# Patient Record
Sex: MALE | Race: ASIAN | NOT HISPANIC OR LATINO | Employment: FULL TIME | URBAN - METROPOLITAN AREA
[De-identification: names, ages, dates, MRNs, and addresses within clinical notes are randomized per-mention and may not be internally consistent; named-entity substitution may affect disease eponyms.]

---

## 2017-09-22 ENCOUNTER — OFFICE VISIT - HEALTHEAST (OUTPATIENT)
Dept: FAMILY MEDICINE | Facility: CLINIC | Age: 57
End: 2017-09-22

## 2017-09-22 DIAGNOSIS — J20.9 ACUTE BRONCHITIS: ICD-10-CM

## 2019-09-30 ENCOUNTER — SURGERY - HEALTHEAST (OUTPATIENT)
Dept: SURGERY | Facility: CLINIC | Age: 59
End: 2019-09-30

## 2019-09-30 ENCOUNTER — ANESTHESIA - HEALTHEAST (OUTPATIENT)
Dept: SURGERY | Facility: CLINIC | Age: 59
End: 2019-09-30

## 2019-09-30 ASSESSMENT — MIFFLIN-ST. JEOR
SCORE: 1175.06
SCORE: 1299.8

## 2019-10-02 ENCOUNTER — HOME CARE/HOSPICE - HEALTHEAST (OUTPATIENT)
Dept: HOME HEALTH SERVICES | Facility: HOME HEALTH | Age: 59
End: 2019-10-02

## 2019-10-04 ENCOUNTER — HOME CARE/HOSPICE - HEALTHEAST (OUTPATIENT)
Dept: HOME HEALTH SERVICES | Facility: HOME HEALTH | Age: 59
End: 2019-10-04

## 2019-10-08 ENCOUNTER — HOME CARE/HOSPICE - HEALTHEAST (OUTPATIENT)
Dept: HOME HEALTH SERVICES | Facility: HOME HEALTH | Age: 59
End: 2019-10-08

## 2020-04-29 ENCOUNTER — RECORDS - HEALTHEAST (OUTPATIENT)
Dept: LAB | Facility: CLINIC | Age: 60
End: 2020-04-29

## 2020-04-29 LAB
ALBUMIN SERPL-MCNC: 3.7 G/DL (ref 3.5–5)
ALP SERPL-CCNC: 143 U/L (ref 45–120)
ALT SERPL W P-5'-P-CCNC: 155 U/L (ref 0–45)
ANION GAP SERPL CALCULATED.3IONS-SCNC: 13 MMOL/L (ref 5–18)
AST SERPL W P-5'-P-CCNC: 61 U/L (ref 0–40)
BILIRUB SERPL-MCNC: 0.5 MG/DL (ref 0–1)
BUN SERPL-MCNC: 16 MG/DL (ref 8–22)
CALCIUM SERPL-MCNC: 9 MG/DL (ref 8.5–10.5)
CHLORIDE BLD-SCNC: 108 MMOL/L (ref 98–107)
CHOLEST SERPL-MCNC: 212 MG/DL
CO2 SERPL-SCNC: 17 MMOL/L (ref 22–31)
CREAT SERPL-MCNC: 0.84 MG/DL (ref 0.7–1.3)
FASTING STATUS PATIENT QL REPORTED: ABNORMAL
GFR SERPL CREATININE-BSD FRML MDRD: >60 ML/MIN/1.73M2
GLUCOSE BLD-MCNC: 108 MG/DL (ref 70–125)
HDLC SERPL-MCNC: 47 MG/DL
LDLC SERPL CALC-MCNC: 122 MG/DL
LIPASE SERPL-CCNC: 41 U/L (ref 0–52)
POTASSIUM BLD-SCNC: 3.9 MMOL/L (ref 3.5–5)
PROT SERPL-MCNC: 7.9 G/DL (ref 6–8)
SODIUM SERPL-SCNC: 138 MMOL/L (ref 136–145)
TRIGL SERPL-MCNC: 213 MG/DL

## 2020-04-30 ENCOUNTER — RECORDS - HEALTHEAST (OUTPATIENT)
Dept: LAB | Facility: CLINIC | Age: 60
End: 2020-04-30

## 2020-05-01 LAB
HAV IGM SERPL QL IA: NEGATIVE
HBV CORE IGM SERPL QL IA: NEGATIVE
HBV SURFACE AG SERPL QL IA: NEGATIVE
HCV AB SERPL QL IA: NEGATIVE

## 2020-05-04 LAB — H PYLORI AG STL QL IA: POSITIVE

## 2021-05-26 VITALS
RESPIRATION RATE: 12 BRPM | OXYGEN SATURATION: 98 % | HEART RATE: 70 BPM | DIASTOLIC BLOOD PRESSURE: 60 MMHG | SYSTOLIC BLOOD PRESSURE: 110 MMHG | TEMPERATURE: 98 F

## 2021-05-26 VITALS
DIASTOLIC BLOOD PRESSURE: 70 MMHG | TEMPERATURE: 98.7 F | SYSTOLIC BLOOD PRESSURE: 127 MMHG | OXYGEN SATURATION: 98 % | HEART RATE: 80 BPM

## 2021-05-31 VITALS — WEIGHT: 128.1 LBS

## 2021-06-01 NOTE — ANESTHESIA CARE TRANSFER NOTE
Patient transferred to [facility].  Taken to PACU on room air.  In PACU monitors on, vital signs stable.  SBAR report given to RN on floor per institutional policy and procedure.  Transport called.  Transfer of care.    Last vitals:   Vitals:    09/30/19 2035   BP: 145/77   Pulse: 60   Resp: 14   Temp: 36.4  C (97.5  F)   SpO2: 100%     Patient's level of consciousness is drowsy  Spontaneous respirations: yes  Maintains airway independently: yes  Dentition unchanged: yes  Oropharynx: oropharynx clear of all foreign objects    QCDR Measures:  ASA# 20 - Surgical Safety Checklist: WHO surgical safety checklist completed prior to induction    PQRS# 430 - Adult PONV Prevention: 4558F - Pt received => 2 anti-emetic agents (different classes) preop & intraop  ASA# 8 - Peds PONV Prevention: NA - Not pediatric patient, not GA or 2 or more risk factors NOT present  PQRS# 424 - Roma-op Temp Management: 4559F - At least one body temp DOCUMENTED => 35.5C or 95.9F within required timeframe  PQRS# 426 - PACU Transfer Protocol: - Transfer of care checklist used  ASA# 14 - Acute Post-op Pain: ASA14B - Patient did NOT experience pain >= 7 out of 10

## 2021-06-01 NOTE — ANESTHESIA PREPROCEDURE EVALUATION
Anesthesia Evaluation      Patient summary reviewed     Airway   Mallampati: II   Pulmonary - normal exam                          Cardiovascular   Rate: normal,         Neuro/Psych      Endo/Other       GI/Hepatic/Renal            Dental - normal exam                        Anesthesia Plan  Planned anesthetic: peripheral nerve block    ASA 2     Anesthetic plan and risks discussed with: patient  Anesthesia plan special considerations: antiemetics,   Post-op plan: routine recovery

## 2021-06-01 NOTE — ANESTHESIA PROCEDURE NOTES
Peripheral Block    Patient location during procedure: pre-op  Start time: 9/30/2019 7:35 PM  End time: 9/30/2019 7:40 PM  post-op analgesia per surgeon order as noted in medical record  Staffing:  Performing  Anesthesiologist: Vic Quigley MD  Preanesthetic Checklist  Completed: patient identified, site marked, risks, benefits, and alternatives discussed, timeout performed, consent obtained, at patient's request, airway assessed, oxygen available, suction available, emergency drugs available and hand hygiene performed  Peripheral Block  Block type: brachial plexus  Prep: ChloraPrep  Patient position: supine  Patient monitoring: cardiac monitor, continuous pulse oximetry, heart rate and blood pressure  Laterality: left  Injection technique: ultrasound guided            Needle  Needle type: Stimuplex   Needle gauge: 21 G  Needle length: 4 in  no peripheral nerve catheter placed  Assessment  Injection assessment: no difficulty with injection, negative aspiration for heme, no paresthesia on injection and incremental injection

## 2021-06-01 NOTE — ANESTHESIA POSTPROCEDURE EVALUATION
Patient: Roma Velásquez  IRRIGATION AND DEBRIDEMENT, LEFT MIDDLE FINGER  Anesthesia type: No value filed.    Patient location: PACU  Last vitals:   Vitals Value Taken Time   /77 9/30/2019  8:35 PM   Temp 36.4  C (97.5  F) 9/30/2019  8:35 PM   Pulse 60 9/30/2019  8:35 PM   Resp 14 9/30/2019  8:35 PM   SpO2 100 % 9/30/2019  8:35 PM     Post vital signs: stable  Level of consciousness: awake and responds to simple questions  Post-anesthesia pain: pain controlled  Post-anesthesia nausea and vomiting: no  Pulmonary: unassisted, return to baseline  Cardiovascular: stable and blood pressure at baseline  Hydration: adequate  Anesthetic events: no    QCDR Measures:  ASA# 11 - Roma-op Cardiac Arrest: ASA11B - Patient did NOT experience unanticipated cardiac arrest  ASA# 12 - Roma-op Mortality Rate: ASA12B - Patient did NOT die  ASA# 13 - PACU Re-Intubation Rate: NA - No ETT / LMA used for case  ASA# 10 - Composite Anes Safety: ASA10A - No serious adverse event    Additional Notes:

## 2021-06-03 VITALS — BODY MASS INDEX: 24.56 KG/M2 | HEIGHT: 62 IN | WEIGHT: 133.5 LBS

## 2021-06-16 PROBLEM — R25.2 TRISMUS: Status: ACTIVE | Noted: 2020-04-19

## 2021-07-03 NOTE — ADDENDUM NOTE
Addendum Note by Vic Quigley MD at 10/1/2019  8:49 AM     Author: Vic Quigley MD Service: -- Author Type: Anesthesiologist    Filed: 10/1/2019  8:49 AM Date of Service: 10/1/2019  8:49 AM Status: Signed    : Vic Quigley MD (Anesthesiologist)       Addendum  created 10/01/19 0849 by Vic Quigley MD    Clinical Note Signed, Intraprocedure Blocks edited, Intraprocedure Meds edited

## 2021-08-06 ENCOUNTER — LAB REQUISITION (OUTPATIENT)
Dept: LAB | Facility: CLINIC | Age: 61
End: 2021-08-06

## 2021-08-06 ENCOUNTER — LAB REQUISITION (OUTPATIENT)
Dept: LAB | Facility: CLINIC | Age: 61
End: 2021-08-06
Payer: COMMERCIAL

## 2021-08-06 DIAGNOSIS — R50.9 FEVER, UNSPECIFIED: ICD-10-CM

## 2021-08-06 PROCEDURE — U0005 INFEC AGEN DETEC AMPLI PROBE: HCPCS | Mod: ORL | Performed by: PHYSICIAN ASSISTANT

## 2021-08-06 PROCEDURE — 87070 CULTURE OTHR SPECIMN AEROBIC: CPT | Performed by: PHYSICIAN ASSISTANT

## 2021-08-07 LAB — SARS-COV-2 RNA RESP QL NAA+PROBE: POSITIVE

## 2021-08-08 LAB — BACTERIA SPEC CULT: NORMAL

## 2022-10-21 ENCOUNTER — LAB REQUISITION (OUTPATIENT)
Dept: LAB | Facility: CLINIC | Age: 62
End: 2022-10-21

## 2022-10-21 DIAGNOSIS — K30 FUNCTIONAL DYSPEPSIA: ICD-10-CM

## 2022-10-21 LAB
ALBUMIN SERPL BCG-MCNC: 4.5 G/DL (ref 3.5–5.2)
ALP SERPL-CCNC: 105 U/L (ref 40–129)
ALT SERPL W P-5'-P-CCNC: 34 U/L (ref 10–50)
ANION GAP SERPL CALCULATED.3IONS-SCNC: 9 MMOL/L (ref 7–15)
AST SERPL W P-5'-P-CCNC: 31 U/L (ref 10–50)
BILIRUB SERPL-MCNC: 0.9 MG/DL
BUN SERPL-MCNC: 11.6 MG/DL (ref 8–23)
CALCIUM SERPL-MCNC: 9.2 MG/DL (ref 8.8–10.2)
CHLORIDE SERPL-SCNC: 100 MMOL/L (ref 98–107)
CREAT SERPL-MCNC: 0.87 MG/DL (ref 0.67–1.17)
DEPRECATED HCO3 PLAS-SCNC: 26 MMOL/L (ref 22–29)
ERYTHROCYTE [DISTWIDTH] IN BLOOD BY AUTOMATED COUNT: 11.4 % (ref 10–15)
GFR SERPL CREATININE-BSD FRML MDRD: >90 ML/MIN/1.73M2
GLUCOSE SERPL-MCNC: 73 MG/DL (ref 70–99)
HCT VFR BLD AUTO: 50.5 % (ref 40–53)
HGB BLD-MCNC: 16.5 G/DL (ref 13.3–17.7)
LIPASE SERPL-CCNC: 21 U/L (ref 13–60)
MCH RBC QN AUTO: 30.1 PG (ref 26.5–33)
MCHC RBC AUTO-ENTMCNC: 32.7 G/DL (ref 31.5–36.5)
MCV RBC AUTO: 92 FL (ref 78–100)
PLATELET # BLD AUTO: 268 10E3/UL (ref 150–450)
POTASSIUM SERPL-SCNC: 4.4 MMOL/L (ref 3.4–5.3)
PROT SERPL-MCNC: 7.4 G/DL (ref 6.4–8.3)
RBC # BLD AUTO: 5.49 10E6/UL (ref 4.4–5.9)
SODIUM SERPL-SCNC: 135 MMOL/L (ref 136–145)
WBC # BLD AUTO: 5.9 10E3/UL (ref 4–11)

## 2022-10-21 PROCEDURE — 80053 COMPREHEN METABOLIC PANEL: CPT | Performed by: FAMILY MEDICINE

## 2022-10-21 PROCEDURE — 83690 ASSAY OF LIPASE: CPT | Performed by: FAMILY MEDICINE

## 2022-10-21 PROCEDURE — 85027 COMPLETE CBC AUTOMATED: CPT | Performed by: FAMILY MEDICINE

## 2023-11-18 ENCOUNTER — HOSPITAL ENCOUNTER (EMERGENCY)
Facility: HOSPITAL | Age: 63
Discharge: HOME | End: 2023-11-18
Attending: EMERGENCY MEDICINE | Admitting: EMERGENCY MEDICINE
Payer: COMMERCIAL

## 2023-11-18 ENCOUNTER — APPOINTMENT (OUTPATIENT)
Dept: ULTRASOUND IMAGING | Facility: HOSPITAL | Age: 63
End: 2023-11-18
Attending: EMERGENCY MEDICINE
Payer: COMMERCIAL

## 2023-11-18 ENCOUNTER — APPOINTMENT (OUTPATIENT)
Dept: RADIOLOGY | Facility: HOSPITAL | Age: 63
End: 2023-11-18
Attending: EMERGENCY MEDICINE
Payer: COMMERCIAL

## 2023-11-18 VITALS
DIASTOLIC BLOOD PRESSURE: 77 MMHG | SYSTOLIC BLOOD PRESSURE: 109 MMHG | HEART RATE: 56 BPM | TEMPERATURE: 98 F | WEIGHT: 144 LBS | HEIGHT: 62 IN | OXYGEN SATURATION: 92 % | BODY MASS INDEX: 26.5 KG/M2 | RESPIRATION RATE: 16 BRPM

## 2023-11-18 DIAGNOSIS — R06.00 DYSPNEA, UNSPECIFIED TYPE: ICD-10-CM

## 2023-11-18 DIAGNOSIS — J06.9 VIRAL UPPER RESPIRATORY INFECTION: ICD-10-CM

## 2023-11-18 DIAGNOSIS — R10.11 ABDOMINAL PAIN, RIGHT UPPER QUADRANT: ICD-10-CM

## 2023-11-18 LAB
ALBUMIN SERPL BCG-MCNC: 4.1 G/DL (ref 3.5–5.2)
ALBUMIN UR-MCNC: NEGATIVE MG/DL
ALP SERPL-CCNC: 100 U/L (ref 40–150)
ALT SERPL W P-5'-P-CCNC: 34 U/L (ref 0–70)
ANION GAP SERPL CALCULATED.3IONS-SCNC: 8 MMOL/L (ref 7–15)
APPEARANCE UR: CLEAR
AST SERPL W P-5'-P-CCNC: 27 U/L (ref 0–45)
BASOPHILS # BLD AUTO: 0 10E3/UL (ref 0–0.2)
BASOPHILS NFR BLD AUTO: 0 %
BILIRUB SERPL-MCNC: 0.6 MG/DL
BILIRUB UR QL STRIP: NEGATIVE
BUN SERPL-MCNC: 12.3 MG/DL (ref 8–23)
CALCIUM SERPL-MCNC: 9.2 MG/DL (ref 8.8–10.2)
CHLORIDE SERPL-SCNC: 106 MMOL/L (ref 98–107)
COLOR UR AUTO: NORMAL
CREAT SERPL-MCNC: 0.83 MG/DL (ref 0.67–1.17)
DEPRECATED HCO3 PLAS-SCNC: 23 MMOL/L (ref 22–29)
EGFRCR SERPLBLD CKD-EPI 2021: >90 ML/MIN/1.73M2
EOSINOPHIL # BLD AUTO: 0.2 10E3/UL (ref 0–0.7)
EOSINOPHIL NFR BLD AUTO: 2 %
ERYTHROCYTE [DISTWIDTH] IN BLOOD BY AUTOMATED COUNT: 11 % (ref 10–15)
FLUAV RNA SPEC QL NAA+PROBE: NEGATIVE
FLUBV RNA RESP QL NAA+PROBE: NEGATIVE
GLUCOSE SERPL-MCNC: 91 MG/DL (ref 70–99)
GLUCOSE UR STRIP-MCNC: NEGATIVE MG/DL
HCT VFR BLD AUTO: 46.2 % (ref 40–53)
HGB BLD-MCNC: 15.8 G/DL (ref 13.3–17.7)
HGB UR QL STRIP: NEGATIVE
IMM GRANULOCYTES # BLD: 0 10E3/UL
IMM GRANULOCYTES NFR BLD: 0 %
KETONES UR STRIP-MCNC: NEGATIVE MG/DL
LEUKOCYTE ESTERASE UR QL STRIP: NEGATIVE
LIPASE SERPL-CCNC: 19 U/L (ref 13–60)
LYMPHOCYTES # BLD AUTO: 1.3 10E3/UL (ref 0.8–5.3)
LYMPHOCYTES NFR BLD AUTO: 20 %
MCH RBC QN AUTO: 30.6 PG (ref 26.5–33)
MCHC RBC AUTO-ENTMCNC: 34.2 G/DL (ref 31.5–36.5)
MCV RBC AUTO: 89 FL (ref 78–100)
MONOCYTES # BLD AUTO: 0.6 10E3/UL (ref 0–1.3)
MONOCYTES NFR BLD AUTO: 8 %
NEUTROPHILS # BLD AUTO: 4.7 10E3/UL (ref 1.6–8.3)
NEUTROPHILS NFR BLD AUTO: 70 %
NITRATE UR QL: NEGATIVE
NRBC # BLD AUTO: 0 10E3/UL
NRBC BLD AUTO-RTO: 0 /100
PH UR STRIP: 5.5 [PH] (ref 5–7)
PLATELET # BLD AUTO: 247 10E3/UL (ref 150–450)
POTASSIUM SERPL-SCNC: 3.8 MMOL/L (ref 3.4–5.3)
PROT SERPL-MCNC: 7.3 G/DL (ref 6.4–8.3)
RBC # BLD AUTO: 5.17 10E6/UL (ref 4.4–5.9)
RBC URINE: 0 /HPF
RSV RNA SPEC NAA+PROBE: NEGATIVE
SARS-COV-2 RNA RESP QL NAA+PROBE: NEGATIVE
SODIUM SERPL-SCNC: 137 MMOL/L (ref 135–145)
SP GR UR STRIP: 1.02 (ref 1–1.03)
SQUAMOUS EPITHELIAL: <1 /HPF
TROPONIN T SERPL HS-MCNC: 7 NG/L
UROBILINOGEN UR STRIP-MCNC: <2 MG/DL
WBC # BLD AUTO: 6.8 10E3/UL (ref 4–11)
WBC URINE: 1 /HPF

## 2023-11-18 PROCEDURE — 71045 X-RAY EXAM CHEST 1 VIEW: CPT

## 2023-11-18 PROCEDURE — 99285 EMERGENCY DEPT VISIT HI MDM: CPT | Mod: 25

## 2023-11-18 PROCEDURE — 85025 COMPLETE CBC W/AUTO DIFF WBC: CPT | Performed by: EMERGENCY MEDICINE

## 2023-11-18 PROCEDURE — 250N000013 HC RX MED GY IP 250 OP 250 PS 637: Performed by: EMERGENCY MEDICINE

## 2023-11-18 PROCEDURE — 96375 TX/PRO/DX INJ NEW DRUG ADDON: CPT

## 2023-11-18 PROCEDURE — 80053 COMPREHEN METABOLIC PANEL: CPT | Performed by: EMERGENCY MEDICINE

## 2023-11-18 PROCEDURE — 76705 ECHO EXAM OF ABDOMEN: CPT

## 2023-11-18 PROCEDURE — 96374 THER/PROPH/DIAG INJ IV PUSH: CPT

## 2023-11-18 PROCEDURE — 250N000011 HC RX IP 250 OP 636: Mod: JZ | Performed by: EMERGENCY MEDICINE

## 2023-11-18 PROCEDURE — 93005 ELECTROCARDIOGRAM TRACING: CPT | Performed by: EMERGENCY MEDICINE

## 2023-11-18 PROCEDURE — C9113 INJ PANTOPRAZOLE SODIUM, VIA: HCPCS | Mod: JZ | Performed by: EMERGENCY MEDICINE

## 2023-11-18 PROCEDURE — 81001 URINALYSIS AUTO W/SCOPE: CPT | Performed by: EMERGENCY MEDICINE

## 2023-11-18 PROCEDURE — 36415 COLL VENOUS BLD VENIPUNCTURE: CPT | Performed by: EMERGENCY MEDICINE

## 2023-11-18 PROCEDURE — 83690 ASSAY OF LIPASE: CPT | Performed by: EMERGENCY MEDICINE

## 2023-11-18 PROCEDURE — 87637 SARSCOV2&INF A&B&RSV AMP PRB: CPT | Performed by: EMERGENCY MEDICINE

## 2023-11-18 PROCEDURE — 84484 ASSAY OF TROPONIN QUANT: CPT | Performed by: EMERGENCY MEDICINE

## 2023-11-18 RX ORDER — ONDANSETRON 4 MG/1
4 TABLET, ORALLY DISINTEGRATING ORAL EVERY 8 HOURS PRN
Qty: 10 TABLET | Refills: 0 | Status: SHIPPED | OUTPATIENT
Start: 2023-11-18 | End: 2023-11-18

## 2023-11-18 RX ORDER — FAMOTIDINE 20 MG/1
20 TABLET, FILM COATED ORAL 2 TIMES DAILY PRN
Qty: 30 TABLET | Refills: 0 | Status: SHIPPED | OUTPATIENT
Start: 2023-11-18

## 2023-11-18 RX ORDER — BENZONATATE 100 MG/1
100 CAPSULE ORAL 3 TIMES DAILY PRN
Qty: 15 CAPSULE | Refills: 0 | Status: SHIPPED | OUTPATIENT
Start: 2023-11-18

## 2023-11-18 RX ORDER — BENZONATATE 100 MG/1
100 CAPSULE ORAL 3 TIMES DAILY PRN
Qty: 15 CAPSULE | Refills: 0 | Status: SHIPPED | OUTPATIENT
Start: 2023-11-18 | End: 2023-11-18

## 2023-11-18 RX ORDER — PREDNISONE 50 MG/1
50 TABLET ORAL DAILY
Qty: 5 TABLET | Refills: 0 | Status: SHIPPED | OUTPATIENT
Start: 2023-11-18 | End: 2023-11-18

## 2023-11-18 RX ORDER — INHALER, ASSIST DEVICES
SPACER (EA) MISCELLANEOUS
Qty: 1 EACH | Refills: 0 | Status: SHIPPED | OUTPATIENT
Start: 2023-11-18

## 2023-11-18 RX ORDER — INHALER, ASSIST DEVICES
SPACER (EA) MISCELLANEOUS
Qty: 1 EACH | Refills: 0 | Status: SHIPPED | OUTPATIENT
Start: 2023-11-18 | End: 2023-11-18

## 2023-11-18 RX ORDER — ONDANSETRON 2 MG/ML
4 INJECTION INTRAMUSCULAR; INTRAVENOUS ONCE
Status: COMPLETED | OUTPATIENT
Start: 2023-11-18 | End: 2023-11-18

## 2023-11-18 RX ORDER — ALBUTEROL SULFATE 90 UG/1
2 AEROSOL, METERED RESPIRATORY (INHALATION) EVERY 4 HOURS PRN
Qty: 6.7 G | Refills: 0 | Status: SHIPPED | OUTPATIENT
Start: 2023-11-18

## 2023-11-18 RX ORDER — MAGNESIUM HYDROXIDE/ALUMINUM HYDROXICE/SIMETHICONE 120; 1200; 1200 MG/30ML; MG/30ML; MG/30ML
15 SUSPENSION ORAL ONCE
Status: COMPLETED | OUTPATIENT
Start: 2023-11-18 | End: 2023-11-18

## 2023-11-18 RX ORDER — PREDNISONE 50 MG/1
50 TABLET ORAL DAILY
Qty: 5 TABLET | Refills: 0 | Status: SHIPPED | OUTPATIENT
Start: 2023-11-18

## 2023-11-18 RX ORDER — ONDANSETRON 4 MG/1
4 TABLET, ORALLY DISINTEGRATING ORAL EVERY 8 HOURS PRN
Qty: 10 TABLET | Refills: 0 | Status: SHIPPED | OUTPATIENT
Start: 2023-11-18

## 2023-11-18 RX ORDER — FENTANYL CITRATE 50 UG/ML
50 INJECTION, SOLUTION INTRAMUSCULAR; INTRAVENOUS ONCE
Status: COMPLETED | OUTPATIENT
Start: 2023-11-18 | End: 2023-11-18

## 2023-11-18 RX ORDER — FAMOTIDINE 20 MG/1
20 TABLET, FILM COATED ORAL 2 TIMES DAILY PRN
Qty: 30 TABLET | Refills: 0 | Status: SHIPPED | OUTPATIENT
Start: 2023-11-18 | End: 2023-11-18

## 2023-11-18 RX ORDER — ALBUTEROL SULFATE 90 UG/1
2 AEROSOL, METERED RESPIRATORY (INHALATION) EVERY 4 HOURS PRN
Qty: 6.7 G | Refills: 0 | Status: SHIPPED | OUTPATIENT
Start: 2023-11-18 | End: 2023-11-18

## 2023-11-18 RX ADMIN — PANTOPRAZOLE SODIUM 40 MG: 40 INJECTION, POWDER, FOR SOLUTION INTRAVENOUS at 16:29

## 2023-11-18 RX ADMIN — FENTANYL CITRATE 50 MCG: 50 INJECTION, SOLUTION INTRAMUSCULAR; INTRAVENOUS at 14:41

## 2023-11-18 RX ADMIN — ONDANSETRON 4 MG: 2 INJECTION INTRAMUSCULAR; INTRAVENOUS at 14:39

## 2023-11-18 RX ADMIN — ALUMINUM HYDROXIDE, MAGNESIUM HYDROXIDE, AND SIMETHICONE 15 ML: 200; 200; 20 SUSPENSION ORAL at 13:55

## 2023-11-18 ASSESSMENT — ACTIVITIES OF DAILY LIVING (ADL)
ADLS_ACUITY_SCORE: 35
ADLS_ACUITY_SCORE: 35

## 2023-11-18 NOTE — ED NOTES
eMERGENCY dEPARTMENT PROGRESS NOTE         ED COURSE AND MEDICAL DECISION MAKING  Patient was signed out to me by Dr. Javier at 4:21 PM      Roma Velásquez is a 63 year old male who presents for evaluation of right upper quadrant pain for last 10 years that got worse in the last 2 days.  ED Course as of 11/18/23 1734   Sat Nov 18, 2023   1620 Patient was signed out to me at change of shift pending ultrasound of the right upper quadrant as well as a chest x-ray and COVID swab.  If everything comes back okay he can discharge home.   1733 The ultrasound, chest x-ray, and COVID swab are unremarkable.  I will the patient know and we can get him discharged home.       LAB  Pertinent labs results reviewed   Labs Ordered and Resulted from Time of ED Arrival to Time of ED Departure   COMPREHENSIVE METABOLIC PANEL - Normal       Result Value    Sodium 137      Potassium 3.8      Carbon Dioxide (CO2) 23      Anion Gap 8      Urea Nitrogen 12.3      Creatinine 0.83      GFR Estimate >90      Calcium 9.2      Chloride 106      Glucose 91      Alkaline Phosphatase 100      AST 27      ALT 34      Protein Total 7.3      Albumin 4.1      Bilirubin Total 0.6     LIPASE - Normal    Lipase 19     TROPONIN T, HIGH SENSITIVITY - Normal    Troponin T, High Sensitivity 7     ROUTINE UA WITH MICROSCOPIC REFLEX TO CULTURE - Normal    Color Urine Light Yellow      Appearance Urine Clear      Glucose Urine Negative      Bilirubin Urine Negative      Ketones Urine Negative      Specific Gravity Urine 1.018      Blood Urine Negative      pH Urine 5.5      Protein Albumin Urine Negative      Urobilinogen Urine <2.0      Nitrite Urine Negative      Leukocyte Esterase Urine Negative      RBC Urine 0      WBC Urine 1      Squamous Epithelials Urine <1     INFLUENZA A/B, RSV, & SARS-COV2 PCR - Normal    Influenza A PCR Negative      Influenza B PCR Negative      RSV PCR Negative      SARS CoV2 PCR Negative     CBC WITH PLATELETS AND DIFFERENTIAL    WBC  Count 6.8      RBC Count 5.17      Hemoglobin 15.8      Hematocrit 46.2      MCV 89      MCH 30.6      MCHC 34.2      RDW 11.0      Platelet Count 247      % Neutrophils 70      % Lymphocytes 20      % Monocytes 8      % Eosinophils 2      % Basophils 0      % Immature Granulocytes 0      NRBCs per 100 WBC 0      Absolute Neutrophils 4.7      Absolute Lymphocytes 1.3      Absolute Monocytes 0.6      Absolute Eosinophils 0.2      Absolute Basophils 0.0      Absolute Immature Granulocytes 0.0      Absolute NRBCs 0.0         RADIOLOGY    Pertinent imaging reviewed   Please see official radiology report.  Abdomen US, limited (RUQ only)   Final Result   IMPRESSION:   1.  A 5 mm gallbladder polyp.      Gallbladder polyp <= 9 mm: No follow-up      REFERENCE:   Management of Incidentally Detected Gallbladder Polyps: Society of Radiologists in Ultrasound Consensus Conference Recommendations. Radiology 2022; 000:1-12         XR Chest Port 1 View   Final Result   IMPRESSION: Lungs are clear. No pleural effusion. Heart size and pulmonary vascularity within normal limits.          FINAL IMPRESSION    1. Abdominal pain, right upper quadrant    2. Dyspnea, unspecified type    3. Viral upper respiratory infection         DISCHARGE MEDICATIONS  Current Discharge Medication List        START taking these medications    Details   albuterol (PROAIR HFA) 108 (90 Base) MCG/ACT inhaler Inhale 2 puffs into the lungs every 4 hours as needed for shortness of breath  Qty: 6.7 g, Refills: 0    Comments: Pharmacy may dispense brand covered by insurance (Proair, or proventil or ventolin or generic albuterol inhaler)      benzonatate (TESSALON) 100 MG capsule Take 1 capsule (100 mg) by mouth 3 times daily as needed for cough  Qty: 15 capsule, Refills: 0      famotidine (PEPCID) 20 MG tablet Take 1 tablet (20 mg) by mouth 2 times daily as needed (heartburn)  Qty: 30 tablet, Refills: 0      ondansetron (ZOFRAN ODT) 4 MG ODT tab Take 1 tablet (4 mg)  by mouth every 8 hours as needed for nausea  Qty: 10 tablet, Refills: 0      predniSONE (DELTASONE) 50 MG tablet Take 1 tablet (50 mg) by mouth daily  Qty: 5 tablet, Refills: 0      spacer (OPTICHAMBER MURRAY) holding chamber Use with inhaler  Qty: 1 each, Refills: 0                Debi Ordoñez MD  11/18/23 7570

## 2023-11-18 NOTE — ED PROVIDER NOTES
EMERGENCY DEPARTMENT ENCOUNTER      NAME: Roma Velásquez  AGE: 63 year old male  YOB: 1960  MRN: [id number]  EVALUATION DATE & TIME: 11/18/2023  1:36 PM    PCP: Antoinette Taylor    ED PROVIDER: Alma Javier M.D.      Chief Complaint   Patient presents with    Abdominal Pain       FINAL IMPRESSION:  1. Abdominal pain, right upper quadrant    2. Dyspnea, unspecified type    3. Viral upper respiratory infection        ED COURSE & MEDICAL DECISION MAKING:    Right upper quadrant abdominal pain.  Acute on chronic in nature, 4 years at least of symptoms.  Has not taken his omeprazole, sucralfate for 2 days.  Treated with pantoprazole, GI cocktail.  Evaluate for acute cholecystitis with right upper quadrant ultrasound.  Stable blood work otherwise.  2.  Cough, 1 week in nature.    No hypertension, diabetes, or other risk factors for ACS, do not suspect cardiac equivalent.  COVID, RSV, influenza swab performed.  Chest x-ray ordered.  Can treat symptomatically with albuterol inhaler with spacer, prednisone if he can be discharged after imaging done.    3:26 PM I met with the patient to gather history and to perform my initial exam. I discussed the plan for care while in the Emergency Department.     Pertinent Labs & Imaging studies reviewed. (See chart for details).    Medical Decision Making    History:  Supplemental history from: Documented in chart, if applicable  External Record(s) reviewed: Documented in chart, if applicable.    Work Up:  Chart documentation includes differential considered and any EKGs or imaging independently interpreted by provider, where specified.  In additional to work up documented, I considered the following work up: Documented in chart, if applicable.    External consultation:  Discussion of management with another provider: Documented in chart, if applicable    Complicating factors:  Care impacted by chronic illness: N/A  Care affected by social determinants of health:  "N/A    Disposition considerations:  signed out to Dr Ordoñez        At the conclusion of the encounter I discussed the results of all of the tests and the disposition. The questions were answered. The patient or family acknowledged understanding and was agreeable with the care plan.      CRITICAL CARE:  N/A    HPI    Patient information was obtained from: Patient, family member    Use of : Yes, Aure, language interpretive services used.       Roma Velásquez is a 63 year old male who presents for shortness of breath, right upper quadrant abdominal pain acute on chronic in nature.  States the symptoms have been there \"for a while \", worse yesterday, associated with decreased appetite, diaphoresis, generalized whole body burning sensation.  Has had symptoms like this before, denies any official diagnosis.  Takes omeprazole 20 daily, sucralfate, Tylenol.  Has not been able to take medications for 2 days due to symptoms.  Denies vomiting, chest pain.  Very remote history of smoking, not currently.  No history of asthma or COPD.  History of appendectomy, no other abdominal surgeries.  Denies urinary symptoms.      Per Chart Review: History of appendectomy , no other abdominal surgeries.      REVIEW OF SYSTEMS  All other systems negative unless noted in HPI.    PAST MEDICAL HISTORY:  Past Medical History:   Diagnosis Date    No disease found        PAST SURGICAL HISTORY:  Past Surgical History:   Procedure Laterality Date    NO PAST SURGERIES           CURRENT MEDICATIONS:    Current Facility-Administered Medications   Medication    pantoprazole (PROTONIX) IV push injection 40 mg     Current Outpatient Medications   Medication    acetaminophen (TYLENOL) 500 MG tablet         ALLERGIES:  No Known Allergies    FAMILY HISTORY:  Family History   Problem Relation Age of Onset    No Known Problems Mother          pf old age, no medical care available    No Known Problems Father          pf old age, no medical " "care available       SOCIAL HISTORY:  Social History     Socioeconomic History    Marital status:    Tobacco Use    Smoking status: Former     Packs/day: 1     Types: Cigarettes     Quit date: 1/1/2008     Years since quitting: 15.8    Smokeless tobacco: Current   Substance and Sexual Activity    Alcohol use: Not Currently    Drug use: Never    Sexual activity: Yes     Partners: Female       VITALS:  Patient Vitals for the past 24 hrs:   BP Temp Temp src Pulse Resp SpO2 Height Weight   11/18/23 1530 127/62 -- -- 62 28 95 % -- --   11/18/23 1334 -- -- -- -- -- -- 1.575 m (5' 2\") 65.3 kg (144 lb)   11/18/23 1332 124/85 98  F (36.7  C) Oral 68 18 95 % -- --       PHYSICAL EXAM    VITAL SIGNS: /62   Pulse 62   Temp 98  F (36.7  C) (Oral)   Resp 28   Ht 1.575 m (5' 2\")   Wt 65.3 kg (144 lb)   SpO2 95%   BMI 26.34 kg/m    Physical Exam  Vitals and nursing note reviewed.   Constitutional:       General: He is not in acute distress.     Appearance: He is not toxic-appearing.   Eyes:      General: No scleral icterus.        Right eye: No discharge.         Left eye: No discharge.   Cardiovascular:      Rate and Rhythm: Normal rate and regular rhythm.   Pulmonary:      Effort: Pulmonary effort is normal. No respiratory distress.      Comments: Diminished breath sounds without acute distress   Abdominal:      General: There is no distension.      Palpations: Abdomen is soft.      Tenderness: There is no abdominal tenderness (RUQ pain to palpation).   Musculoskeletal:         General: No swelling or deformity.      Cervical back: Neck supple. No rigidity.   Skin:     General: Skin is warm and dry.      Capillary Refill: Capillary refill takes less than 2 seconds.      Findings: No bruising or erythema.   Neurological:      General: No focal deficit present.      Mental Status: He is alert and oriented to person, place, and time. Mental status is at baseline.      Comments: No slurred speech, following " commands spontaneously. No facial droop.   Psychiatric:         Mood and Affect: Mood normal.         Behavior: Behavior normal.         LABS  Labs Ordered and Resulted from Time of ED Arrival to Time of ED Departure   COMPREHENSIVE METABOLIC PANEL - Normal       Result Value    Sodium 137      Potassium 3.8      Carbon Dioxide (CO2) 23      Anion Gap 8      Urea Nitrogen 12.3      Creatinine 0.83      GFR Estimate >90      Calcium 9.2      Chloride 106      Glucose 91      Alkaline Phosphatase 100      AST 27      ALT 34      Protein Total 7.3      Albumin 4.1      Bilirubin Total 0.6     LIPASE - Normal    Lipase 19     TROPONIN T, HIGH SENSITIVITY - Normal    Troponin T, High Sensitivity 7     ROUTINE UA WITH MICROSCOPIC REFLEX TO CULTURE - Normal    Color Urine Light Yellow      Appearance Urine Clear      Glucose Urine Negative      Bilirubin Urine Negative      Ketones Urine Negative      Specific Gravity Urine 1.018      Blood Urine Negative      pH Urine 5.5      Protein Albumin Urine Negative      Urobilinogen Urine <2.0      Nitrite Urine Negative      Leukocyte Esterase Urine Negative      RBC Urine 0      WBC Urine 1      Squamous Epithelials Urine <1     CBC WITH PLATELETS AND DIFFERENTIAL    WBC Count 6.8      RBC Count 5.17      Hemoglobin 15.8      Hematocrit 46.2      MCV 89      MCH 30.6      MCHC 34.2      RDW 11.0      Platelet Count 247      % Neutrophils 70      % Lymphocytes 20      % Monocytes 8      % Eosinophils 2      % Basophils 0      % Immature Granulocytes 0      NRBCs per 100 WBC 0      Absolute Neutrophils 4.7      Absolute Lymphocytes 1.3      Absolute Monocytes 0.6      Absolute Eosinophils 0.2      Absolute Basophils 0.0      Absolute Immature Granulocytes 0.0      Absolute NRBCs 0.0     INFLUENZA A/B, RSV, & SARS-COV2 PCR         RADIOLOGY  Abdomen US, limited (RUQ only)    (Results Pending)   XR Chest Port 1 View    (Results Pending)      NA      EKG:    NA        PROCEDURES:  N/A      MEDICATIONS GIVEN IN THE EMERGENCY:  Medications   pantoprazole (PROTONIX) IV push injection 40 mg (has no administration in time range)   alum & mag hydroxide-simethicone (MAALOX) suspension 15 mL (15 mLs Oral $Given 11/18/23 1355)   fentaNYL (PF) (SUBLIMAZE) injection 50 mcg (50 mcg Intravenous $Given 11/18/23 1441)   ondansetron (ZOFRAN) injection 4 mg (4 mg Intravenous $Given 11/18/23 1439)       NEW PRESCRIPTIONS STARTED AT TODAY'S ER VISIT  New Prescriptions    No medications on file        Alma Javier MD  Emergency Medicine  [facility] EMERGENCY DEPARTMENT  [facility] [facility] [address] [phone]  Dept: [phone]             Alma Javier MD  11/18/23 1227

## 2023-11-18 NOTE — DISCHARGE INSTRUCTIONS
Chest x-ray, right upper quadrant ultrasound performed, no acute cholecystitis today.    Likely an upper respiratory infection caused from a virus.  Negative COVID, influenza, RSV swab today.  Try using inhaler, take other meds as needed (see rx list).   Blood work and urinalysis normal today.

## 2023-11-18 NOTE — ED TRIAGE NOTES
"Pt ambulatory to triage c/o \"burning\" epigastric pain since last night. Pt has had this problem before. Denies n/v/d, denies fever. Pt also c/o \"burning\" in his legs.      Triage Assessment (Adult)       Row Name 11/18/23 2717          Triage Assessment    Airway WDL WDL        Respiratory WDL    Respiratory WDL WDL        Skin Circulation/Temperature WDL    Skin Circulation/Temperature WDL WDL        Cardiac WDL    Cardiac WDL WDL        Peripheral/Neurovascular WDL    Peripheral Neurovascular WDL WDL        Cognitive/Neuro/Behavioral WDL    Cognitive/Neuro/Behavioral WDL WDL                     "

## 2023-11-20 ENCOUNTER — LAB REQUISITION (OUTPATIENT)
Dept: LAB | Facility: CLINIC | Age: 63
End: 2023-11-20

## 2023-11-20 DIAGNOSIS — Z86.19 PERSONAL HISTORY OF OTHER INFECTIOUS AND PARASITIC DISEASES: ICD-10-CM

## 2023-11-20 LAB
ATRIAL RATE - MUSE: 64 BPM
DIASTOLIC BLOOD PRESSURE - MUSE: NORMAL MMHG
INTERPRETATION ECG - MUSE: NORMAL
P AXIS - MUSE: 48 DEGREES
PR INTERVAL - MUSE: 160 MS
QRS DURATION - MUSE: 80 MS
QT - MUSE: 414 MS
QTC - MUSE: 427 MS
R AXIS - MUSE: 44 DEGREES
SYSTOLIC BLOOD PRESSURE - MUSE: NORMAL MMHG
T AXIS - MUSE: 29 DEGREES
VENTRICULAR RATE- MUSE: 64 BPM

## 2023-11-20 PROCEDURE — 87338 HPYLORI STOOL AG IA: CPT | Performed by: NURSE PRACTITIONER

## 2023-11-21 LAB — H PYLORI AG STL QL IA: POSITIVE

## 2024-02-23 ENCOUNTER — TRANSFERRED RECORDS (OUTPATIENT)
Dept: HEALTH INFORMATION MANAGEMENT | Facility: CLINIC | Age: 64
End: 2024-02-23
Payer: COMMERCIAL

## 2024-02-23 ENCOUNTER — LAB REQUISITION (OUTPATIENT)
Dept: LAB | Facility: CLINIC | Age: 64
End: 2024-02-23

## 2024-02-23 DIAGNOSIS — R20.2 PARESTHESIA OF SKIN: ICD-10-CM

## 2024-02-23 PROCEDURE — 80053 COMPREHEN METABOLIC PANEL: CPT | Performed by: PHYSICIAN ASSISTANT

## 2024-02-23 PROCEDURE — 86618 LYME DISEASE ANTIBODY: CPT | Performed by: PHYSICIAN ASSISTANT

## 2024-02-23 PROCEDURE — 82607 VITAMIN B-12: CPT | Performed by: PHYSICIAN ASSISTANT

## 2024-02-23 PROCEDURE — 84443 ASSAY THYROID STIM HORMONE: CPT | Performed by: PHYSICIAN ASSISTANT

## 2024-02-24 LAB
ALBUMIN SERPL BCG-MCNC: 4.3 G/DL (ref 3.5–5.2)
ALP SERPL-CCNC: 98 U/L (ref 40–150)
ALT SERPL W P-5'-P-CCNC: 38 U/L (ref 0–70)
ANION GAP SERPL CALCULATED.3IONS-SCNC: 11 MMOL/L (ref 7–15)
AST SERPL W P-5'-P-CCNC: 33 U/L (ref 0–45)
BILIRUB SERPL-MCNC: 0.5 MG/DL
BUN SERPL-MCNC: 10.7 MG/DL (ref 8–23)
CALCIUM SERPL-MCNC: 8.8 MG/DL (ref 8.8–10.2)
CHLORIDE SERPL-SCNC: 105 MMOL/L (ref 98–107)
CREAT SERPL-MCNC: 0.99 MG/DL (ref 0.67–1.17)
DEPRECATED HCO3 PLAS-SCNC: 23 MMOL/L (ref 22–29)
EGFRCR SERPLBLD CKD-EPI 2021: 87 ML/MIN/1.73M2
GLUCOSE SERPL-MCNC: 69 MG/DL (ref 70–99)
POTASSIUM SERPL-SCNC: 4.1 MMOL/L (ref 3.4–5.3)
PROT SERPL-MCNC: 7.4 G/DL (ref 6.4–8.3)
SODIUM SERPL-SCNC: 139 MMOL/L (ref 135–145)
TSH SERPL DL<=0.005 MIU/L-ACNC: 0.75 UIU/ML (ref 0.3–4.2)
VIT B12 SERPL-MCNC: 538 PG/ML (ref 232–1245)

## 2024-02-26 ENCOUNTER — TRANSFERRED RECORDS (OUTPATIENT)
Dept: HEALTH INFORMATION MANAGEMENT | Facility: CLINIC | Age: 64
End: 2024-02-26
Payer: COMMERCIAL

## 2024-02-26 LAB — B BURGDOR IGG+IGM SER QL: 0.23

## 2024-02-29 ENCOUNTER — MEDICAL CORRESPONDENCE (OUTPATIENT)
Dept: HEALTH INFORMATION MANAGEMENT | Facility: CLINIC | Age: 64
End: 2024-02-29
Payer: COMMERCIAL

## 2024-03-01 ENCOUNTER — TRANSCRIBE ORDERS (OUTPATIENT)
Dept: OTHER | Age: 64
End: 2024-03-01

## 2024-03-01 DIAGNOSIS — R20.2 PARESTHESIAS: Primary | ICD-10-CM

## 2024-05-08 ENCOUNTER — PATIENT OUTREACH (OUTPATIENT)
Dept: CARE COORDINATION | Facility: CLINIC | Age: 64
End: 2024-05-08
Payer: COMMERCIAL

## 2024-05-08 NOTE — PROGRESS NOTES
Clinic Care Coordination Contact  Program:   [facility]: [place]   Renewal: [employer]   Date Applied:      SAM Outreach:   5/8/24: CTA called to see if patient needed assistance with their [employer] Renewal. Patient declined needing assistance and no follow up needed   Lisa Lenz  Care   [facility]  Clinic Care Coordination  [phone]      Health Insurance:        Referral/Screening:

## 2024-07-10 ENCOUNTER — APPOINTMENT (OUTPATIENT)
Dept: GENERAL RADIOLOGY | Facility: CLINIC | Age: 64
End: 2024-07-10
Attending: EMERGENCY MEDICINE
Payer: OTHER MISCELLANEOUS

## 2024-07-10 ENCOUNTER — HOSPITAL ENCOUNTER (EMERGENCY)
Facility: CLINIC | Age: 64
Discharge: ANOTHER HOSPITAL | End: 2024-07-10
Attending: EMERGENCY MEDICINE | Admitting: EMERGENCY MEDICINE
Payer: OTHER MISCELLANEOUS

## 2024-07-10 VITALS
HEART RATE: 112 BPM | SYSTOLIC BLOOD PRESSURE: 107 MMHG | TEMPERATURE: 97.8 F | RESPIRATION RATE: 20 BRPM | DIASTOLIC BLOOD PRESSURE: 66 MMHG | OXYGEN SATURATION: 98 %

## 2024-07-10 DIAGNOSIS — S61.411A LACERATION OF RIGHT HAND WITHOUT FOREIGN BODY, INITIAL ENCOUNTER: ICD-10-CM

## 2024-07-10 DIAGNOSIS — S67.21XA CRUSHING INJURY OF RIGHT HAND, INITIAL ENCOUNTER: ICD-10-CM

## 2024-07-10 LAB
ANION GAP SERPL CALCULATED.3IONS-SCNC: 11 MMOL/L (ref 7–15)
BASOPHILS # BLD AUTO: 0 10E3/UL (ref 0–0.2)
BASOPHILS NFR BLD AUTO: 0 %
BUN SERPL-MCNC: 17.8 MG/DL (ref 8–23)
CALCIUM SERPL-MCNC: 8.9 MG/DL (ref 8.8–10.2)
CHLORIDE SERPL-SCNC: 107 MMOL/L (ref 98–107)
CREAT SERPL-MCNC: 0.85 MG/DL (ref 0.67–1.17)
DEPRECATED HCO3 PLAS-SCNC: 21 MMOL/L (ref 22–29)
EGFRCR SERPLBLD CKD-EPI 2021: >90 ML/MIN/1.73M2
EOSINOPHIL # BLD AUTO: 0.1 10E3/UL (ref 0–0.7)
EOSINOPHIL NFR BLD AUTO: 2 %
ERYTHROCYTE [DISTWIDTH] IN BLOOD BY AUTOMATED COUNT: 11.8 % (ref 10–15)
GLUCOSE SERPL-MCNC: 98 MG/DL (ref 70–99)
HCT VFR BLD AUTO: 45.5 % (ref 40–53)
HGB BLD-MCNC: 15.5 G/DL (ref 13.3–17.7)
HOLD SPECIMEN: NORMAL
HOLD SPECIMEN: NORMAL
IMM GRANULOCYTES # BLD: 0 10E3/UL
IMM GRANULOCYTES NFR BLD: 0 %
INR PPP: 0.95 (ref 0.85–1.15)
LYMPHOCYTES # BLD AUTO: 1.8 10E3/UL (ref 0.8–5.3)
LYMPHOCYTES NFR BLD AUTO: 22 %
MCH RBC QN AUTO: 31.1 PG (ref 26.5–33)
MCHC RBC AUTO-ENTMCNC: 34.1 G/DL (ref 31.5–36.5)
MCV RBC AUTO: 91 FL (ref 78–100)
MONOCYTES # BLD AUTO: 0.6 10E3/UL (ref 0–1.3)
MONOCYTES NFR BLD AUTO: 8 %
NEUTROPHILS # BLD AUTO: 5.6 10E3/UL (ref 1.6–8.3)
NEUTROPHILS NFR BLD AUTO: 68 %
NRBC # BLD AUTO: 0 10E3/UL
NRBC BLD AUTO-RTO: 0 /100
PLATELET # BLD AUTO: 241 10E3/UL (ref 150–450)
POTASSIUM SERPL-SCNC: 3.7 MMOL/L (ref 3.4–5.3)
RBC # BLD AUTO: 4.98 10E6/UL (ref 4.4–5.9)
SODIUM SERPL-SCNC: 139 MMOL/L (ref 135–145)
WBC # BLD AUTO: 8.2 10E3/UL (ref 4–11)

## 2024-07-10 PROCEDURE — 73140 X-RAY EXAM OF FINGER(S): CPT | Mod: RT

## 2024-07-10 PROCEDURE — 36415 COLL VENOUS BLD VENIPUNCTURE: CPT | Performed by: EMERGENCY MEDICINE

## 2024-07-10 PROCEDURE — 99285 EMERGENCY DEPT VISIT HI MDM: CPT | Performed by: EMERGENCY MEDICINE

## 2024-07-10 PROCEDURE — 90715 TDAP VACCINE 7 YRS/> IM: CPT | Performed by: EMERGENCY MEDICINE

## 2024-07-10 PROCEDURE — 96365 THER/PROPH/DIAG IV INF INIT: CPT | Performed by: EMERGENCY MEDICINE

## 2024-07-10 PROCEDURE — 73140 X-RAY EXAM OF FINGER(S): CPT | Mod: 26 | Performed by: RADIOLOGY

## 2024-07-10 PROCEDURE — 90471 IMMUNIZATION ADMIN: CPT | Performed by: EMERGENCY MEDICINE

## 2024-07-10 PROCEDURE — 85025 COMPLETE CBC W/AUTO DIFF WBC: CPT | Performed by: EMERGENCY MEDICINE

## 2024-07-10 PROCEDURE — 80048 BASIC METABOLIC PNL TOTAL CA: CPT | Performed by: EMERGENCY MEDICINE

## 2024-07-10 PROCEDURE — 85610 PROTHROMBIN TIME: CPT | Performed by: EMERGENCY MEDICINE

## 2024-07-10 PROCEDURE — 250N000011 HC RX IP 250 OP 636: Performed by: EMERGENCY MEDICINE

## 2024-07-10 PROCEDURE — 73130 X-RAY EXAM OF HAND: CPT | Mod: RT

## 2024-07-10 PROCEDURE — 96372 THER/PROPH/DIAG INJ SC/IM: CPT | Performed by: EMERGENCY MEDICINE

## 2024-07-10 PROCEDURE — 96375 TX/PRO/DX INJ NEW DRUG ADDON: CPT | Performed by: EMERGENCY MEDICINE

## 2024-07-10 PROCEDURE — 99284 EMERGENCY DEPT VISIT MOD MDM: CPT | Mod: 25 | Performed by: EMERGENCY MEDICINE

## 2024-07-10 PROCEDURE — 73130 X-RAY EXAM OF HAND: CPT | Mod: 26 | Performed by: RADIOLOGY

## 2024-07-10 RX ORDER — HYDROMORPHONE HYDROCHLORIDE 1 MG/ML
0.3 INJECTION, SOLUTION INTRAMUSCULAR; INTRAVENOUS; SUBCUTANEOUS ONCE
Status: COMPLETED | OUTPATIENT
Start: 2024-07-10 | End: 2024-07-10

## 2024-07-10 RX ORDER — AMPICILLIN AND SULBACTAM 2; 1 G/1; G/1
3 INJECTION, POWDER, FOR SOLUTION INTRAMUSCULAR; INTRAVENOUS ONCE
Status: COMPLETED | OUTPATIENT
Start: 2024-07-10 | End: 2024-07-10

## 2024-07-10 RX ADMIN — TETANUS IMMUNE GLOBULIN (HUMAN) 250 UNITS: 250 INJECTION INTRAMUSCULAR at 18:08

## 2024-07-10 RX ADMIN — AMPICILLIN SODIUM AND SULBACTAM SODIUM 3 G: 2; 1 INJECTION, POWDER, FOR SOLUTION INTRAMUSCULAR; INTRAVENOUS at 17:44

## 2024-07-10 RX ADMIN — HYDROMORPHONE HYDROCHLORIDE 0.3 MG: 1 INJECTION, SOLUTION INTRAMUSCULAR; INTRAVENOUS; SUBCUTANEOUS at 17:28

## 2024-07-10 RX ADMIN — CLOSTRIDIUM TETANI TOXOID ANTIGEN (FORMALDEHYDE INACTIVATED), CORYNEBACTERIUM DIPHTHERIAE TOXOID ANTIGEN (FORMALDEHYDE INACTIVATED), BORDETELLA PERTUSSIS TOXOID ANTIGEN (GLUTARALDEHYDE INACTIVATED), BORDETELLA PERTUSSIS FILAMENTOUS HEMAGGLUTININ ANTIGEN (FORMALDEHYDE INACTIVATED), BORDETELLA PERTUSSIS PERTACTIN ANTIGEN, AND BORDETELLA PERTUSSIS FIMBRIAE 2/3 ANTIGEN 0.5 ML: 5; 2; 2.5; 5; 3; 5 INJECTION, SUSPENSION INTRAMUSCULAR at 17:28

## 2024-07-10 ASSESSMENT — ACTIVITIES OF DAILY LIVING (ADL)
ADLS_ACUITY_SCORE: 35

## 2024-07-10 ASSESSMENT — COLUMBIA-SUICIDE SEVERITY RATING SCALE - C-SSRS
1. IN THE PAST MONTH, HAVE YOU WISHED YOU WERE DEAD OR WISHED YOU COULD GO TO SLEEP AND NOT WAKE UP?: NO
6. HAVE YOU EVER DONE ANYTHING, STARTED TO DO ANYTHING, OR PREPARED TO DO ANYTHING TO END YOUR LIFE?: NO
2. HAVE YOU ACTUALLY HAD ANY THOUGHTS OF KILLING YOURSELF IN THE PAST MONTH?: NO

## 2024-07-10 NOTE — LETTER
July 10, 2024      To Whom It May Concern:      Roma Velásquez was seen in our Emergency Department today, 07/10/24.     He was given IV Dilaudid, IV Unasyn, Tdap update and tetanus immunoglobulin (had incomplete course historically). Started general gentle irrigation.

## 2024-07-10 NOTE — ED PROVIDER NOTES
[place] EMERGENCY DEPARTMENT ([facility])    7/10/24       ED PROVIDER NOTE    History     Chief Complaint   Patient presents with    Hand Injury     The history is provided by the patient and medical records (coworker/boss/). The history is limited by a language barrier. A  was used.     Roma Velásquez is a 64 year old male (Aure-speaking,  utilized), right hand dominant, with PMH notable for s/p lap appendectomy (11/2021), who last received tetanus immunization in 2019 (though has not completed full series), and is reportedly immunocompetent and not on chronic blood thinners, who presents to the ED for evaluation of work-related right hand injury.     Patient was at work today, when another one of his coworkers had gotten her hand stuck in a piece of machinery. He went to assist her and got her hand free, was reportedly talking to a coworker about how this happened, was showing boss, and then in the process somehow the machine came back on and his right hand ended up being stuck/injured in machinery by the same mechanism. They were able to get his hand out, but noted significant injury/lacerations by this fingers and present now for further evaluation/management.     Here in the ED, he is having pain/discomfort at the right hand/fingers. No pain/injuries proximal to this. No other injuries sustained. No obvious numbness or sensory deficits. ROM/strength limited by pain, but thinks is able to move a little. No known retained foreign bodies.     Patient's TDaP was given on 03/12/2019, but has not completed full tetanus series. Is otherwise immunocompetent.       Past Medical History  PMH: Generally healthy, incomplete tetanus series  PSH: Appendectomy    acetaminophen (TYLENOL) 500 MG tablet  albuterol (PROAIR HFA) 108 (90 Base) MCG/ACT inhaler  benzonatate (TESSALON) 100 MG capsule  famotidine (PEPCID) 20 MG tablet  ondansetron (ZOFRAN ODT) 4 MG ODT tab  predniSONE  (DELTASONE) 50 MG tablet  spacer ([facility]) holding chamber      No Known Allergies  Family History  Family History   Problem Relation Age of Onset    No Known Problems Mother          pf old age, no medical care available    No Known Problems Father          pf old age, no medical care available     Social History   Social History     Tobacco Use    Smoking status: Former     Current packs/day: 0.00     Types: Cigarettes     Quit date: 2008     Years since quittin.5    Smokeless tobacco: Current   Substance Use Topics    Alcohol use: Not Currently    Drug use: Never      A complete review of systems was performed with pertinent positives and negatives noted in the HPI, and all other systems negative.    Physical Exam   BP: 107/66  Pulse: 112  Temp: 97.8  F (36.6  C)  Resp: 20  SpO2: 98 %  Physical Exam  CONSTITUTIONAL: Awake and alert. Non-toxic appearance. No acute distress.   HENT:   - Head: Normocephalic and atraumatic.   - Ears: External ear grossly normal.   - Nose: Nose normal. No rhinorrhea. No epistaxis.   - Mouth/Throat: MMM  EYES: Conjunctivae and lids are normal. No scleral icterus.   NECK: Normal range of motion and phonation normal. Neck supple.  No tracheal deviation, no stridor. No edema or erythema noted.  CARDIOVASCULAR: Normal rate, regular rhythm and no appreciable abnormal heart sounds.  PULMONARY/CHEST: Normal work of breathing. No accessory muscle usage or stridor. No respiratory distress.  No appreciable abnormal breath sounds.  MUSCULOSKELETAL: Extremities warm and seemingly well perfused. LUE unremarkable. Right hand has visible injury (pictured below). Concern for open fx, gross deformity particulary at right 4th digit. Has complex lacerations at palmar base of right 4th and 5th digits, dorsally as well possible deep structure injury, but no visualized FB or gross contamination (though machinery could have been dirty). Cap refill normal in all fingers. ROM  limited by pain and injury but able to move just slightly.  No apparent sensory deficits.  No findings to remainder of RUE, no obvious shoulder, upper arm, elbow or forearm/wrist. Seems to be isolated to hand/fingers.   NEUROLOGIC: Awake, alert. Not disoriented. No seizure activity. GCS 15. No obvious sensory deficits. Motor limited somewhat by pain, but seems to be able to activate a bit   SKIN: Skin is warm and dry. No diaphoresis. No pallor. Complex lacerations/injuries as picture below.   PSYCHIATRIC: Normal mood and affect. Speech and behavior normal. Thought processes linear. Cognition and memory are normal. No SI/HI reported.              ED Course, Procedures, & Data     ED Course as of 07/11/24 0348   Wed Jul 10, 2024   1645 Patient indicated he would like to be seen at the same time same room as his coworker/friend.   1650 Ortho paged   1711 Discussed with Orthopedic attending.  She does not do hand coverage and recommends transfer to [facility]   1711 On phone w/ [facility].  They will not page out/accept for Hand consultation/transfer call until images pushed.  Contacted the Radiology department to push x-rays over a soon as possible and and informed of the time sensitive nature.  They will oblige.  Appreciate their assistance.     1740 Calling [facility] again. Images should have been pushed.  They confirm in the images have been received and are calling their Hand team now.   1754 Spoke with the [facility] Hand surgeon on-call, reviewed case, who recommended we first call the plastic surgeon (which is not generally our Hand coverage service, however he knows that that attending on-call for Plastics here and knows that he is also a Hand surgeon). He will take the call after we speak with that physician. The [facility] attending was paged for consideration of possible consult here vs. Doing so to be able to re-engage Hand at [facility].      1816 Paged Plastics Attending again   1825 Spoke w/ [facility]  Attending. Reviewed case.  He is reviewing images of injuries and pt xrays and will call back w/ additional guidance about who should manage and where.    1843 Discussed w/ Plastics attending and Ortho here at the . Calling [facility] back.    1848 Calling [facility] as well given prolonged disposition/logistic issues.    1858 Spoke w/ [facility]. Reviewed case. They've accepted for transfer (Dr. Willow yarbrough)   1911 Patient declined additional pain medication at this time.   1939 ED RN placing a very loose dressing on patient's hand prior to transfer   2030 Unfortunately, all of the ambulances had to be redirected to critical cases and were not available for transfer and the EMS dispatch informed us based on current volumes they will not be available for multiple hours at this point.  Discussed this, and they would like to be discharged to go by private vehicle to [place].  They are protecting airway, not having any residual effects from the IV narcotics from earlier.  Will remove the IV for safety reasons, and patient not driving, but has  here available who will take them directly to [place] from here.  We have provided them with documentation to provide to those providers at [facility]       The patient's presentation was of high complexity (an acute health issue posing potential threat to life or bodily function).     The patient's evaluation involved:  ordering and/or review of 3+ test(s) in this encounter (see separate area of note for details)  discussion of management or test interpretation with another health professional (see separate area of note for details)     The patient's management necessitated high risk (a parenteral controlled substance), high risk (open fractures requiring urgent specialist intervention), and further care after sign-out to [facility] attending (see their note for further management).      Assessment & Plan    IMPRESSION:   64 year old male w/ PMH notable for (Aure-speaking,   "utilized), right hand dominant, with PMH notable for s/p lap appendectomy (11/2021), who last received tetanus immunization in 2019 (though has not completed full series), and is reportedly immunocompetent and not on chronic blood thinners, who presents to the ED for evaluation of work-related right hand injury.     Clinically, patient appears nontoxic, though has visible significant injury to the right hand as described above. Injury appears isolated to right hand/fingers w/o any findings for injury elsewhere.      I do have concern for open fracture, possible deep structure involvement, complex laceration component, but otherwise seems to be neurovascularly intact. No obvious FB though the machinery itself that caused the injury could have been dirty. Tetanus status is incomplete/has not previously received full series.     PLAN:   - Getting xrays, pain control, basic labs in case needs OR/procedural mgmt, Abx in concern for probable open fracture w/ potentially dirty source, updating Tdap and ordering tetanus immunoglobulin as patient has not had a complete course of such and this certainly a potentially contaminated/dirty wound, even if no gross/visible FB.   - Contacted the Ortho attending who indicates we do not have anyone for Hand coverage and recommends we transfer to [facility].   - Risks/benefits of pursuing imaging reviewed and accepted.     RESULTS:  Imaging: Written preliminary reports reviewed:  - Right hand XR:   \"Acute transverse midshaft fracture of the fourth finger proximal phalanx, with one full shaft width displacement of the distal component of the ulnar direction, and 6 cm fracture overlap. No intra-articular extension or joint malalignment.   Chronic healed fracture deformity of the fifth metacarpal carpal shaft incidentally noted. Otherwise negative right hand. \"  Results/reports reviewed w/ patient who expresses understanding of findings and F/U recommendations.    INTERVENTIONS:   - IV " Dilaudid  - Tdap  - Tetanus immunoglobuliln (dirty wound, no prior complete tetanus series)  - IV Unasyn    RE-EVALUATION:  - Re-examined multiple times. Maintains normal cap refill and warmth. Reports normal sensation  - Pt otherwise continues to do well here in the ED, no acute issues or apparent concerning changes in vitals or clinical appearance.    DISCUSSIONS / UPDATES:  - Had multiple conversations trying to get patient pertinent care, including Ortho staff here at , attempts to transfer to [facility] as per recommendations from  Ortho attending, Plastics at  as per recommendation of the [facility] Hand attending before theyd accept for [facility], and as that was unsuccessful in getting someone to accept for transfer, Plastics here does not cover Hand here at  logistically even though that attending does do Hand surgery elsewhere, and then ultimately discussed  w/ [facility] because of these delays in getting accepting Hand provider. While not the original team the Orthopedic staff here recommended, wanted patient to get care as soon as possible.  - Originally going to transfer to [facility] by EMS bc had IVs in place and receiving narcotic pain medications. Was told EMS wait would not be long but later heard new update in that the ambulance was not going to be available for transfer for multiple hours because of other acuity needs, at which point elected to discharge pt so they could go by POV to [facility] for the care (not driving self). PIVs removed for safety. [facility] updated they would be arriving by private vehicle and to expect them. Pt/guests provided address and documentation to take w/ so receiving facility had all pertinent information and Radiology pushing images to [facility].   - w/ Patient: I have reviewed the available findings, plan with the patient. He expressed understanding and agreement with this plan. All questions answered to the best of our ability at this time.       DISPOSITION/PLANNING:  IMPRESSION:   -  Complex right hand wound/injury w/ finger fx and complex lacerations  - Incomplete tetanus series  DISPOSITION:  - Discharge to transfer directly to [facility] by POV for Hand specialty evaluation/management      ______________________________________________________________________            Dina Hale MD  [facility] EMERGENCY DEPARTMENT  7/10/2024     Dina Hale MD  07/12/24 1600

## 2024-07-10 NOTE — ED TRIAGE NOTES
Arrives ambulatory with a work related right hand injury. Per patient he was trying move the metal piece out the way. As he was grabbing the piece of metal the machine locked onto his finger and hand.      Triage Assessment (Adult)       Row Name 07/10/24 1645          Triage Assessment    Airway WDL WDL        Respiratory WDL    Respiratory WDL WDL        Skin Circulation/Temperature WDL    Skin Circulation/Temperature WDL WDL        Cardiac WDL    Cardiac WDL WDL        Peripheral/Neurovascular WDL    Peripheral Neurovascular WDL WDL        Cognitive/Neuro/Behavioral WDL    Cognitive/Neuro/Behavioral WDL WDL

## 2024-07-11 NOTE — DISCHARGE INSTRUCTIONS
Please go directly / immediately to the [facility] Emergency Department:  [facility] Emergency Department  [address] [address] [phone]    They should be expecting you (we spoke with Dr. Daugherty, who agreed to the transfer to see their team / the Hand Specialists).

## 2024-07-11 NOTE — PROGRESS NOTES
Patient ambulatory on discharge with friends. All discharge reviewed with the patient including transport POV directly to [facility]. Patient departed alert and responsive. PIV removed. Patient verbalized understanding.

## 2025-05-23 ENCOUNTER — HOSPITAL ENCOUNTER (EMERGENCY)
Facility: HOSPITAL | Age: 65
Discharge: HOME | End: 2025-05-23
Payer: COMMERCIAL

## 2025-05-23 VITALS
DIASTOLIC BLOOD PRESSURE: 76 MMHG | HEIGHT: 62 IN | WEIGHT: 144 LBS | TEMPERATURE: 98.4 F | HEART RATE: 71 BPM | OXYGEN SATURATION: 97 % | BODY MASS INDEX: 26.5 KG/M2 | SYSTOLIC BLOOD PRESSURE: 114 MMHG | RESPIRATION RATE: 15 BRPM

## 2025-05-23 DIAGNOSIS — M54.50 CHRONIC LOW BACK PAIN WITHOUT SCIATICA, UNSPECIFIED BACK PAIN LATERALITY: ICD-10-CM

## 2025-05-23 DIAGNOSIS — G89.29 CHRONIC LOW BACK PAIN WITHOUT SCIATICA, UNSPECIFIED BACK PAIN LATERALITY: ICD-10-CM

## 2025-05-23 LAB
ALBUMIN UR-MCNC: NEGATIVE MG/DL
APPEARANCE UR: CLEAR
BILIRUB UR QL STRIP: NEGATIVE
COLOR UR AUTO: ABNORMAL
GLUCOSE UR STRIP-MCNC: NEGATIVE MG/DL
HGB UR QL STRIP: NEGATIVE
KETONES UR STRIP-MCNC: NEGATIVE MG/DL
LEUKOCYTE ESTERASE UR QL STRIP: NEGATIVE
MUCOUS THREADS #/AREA URNS LPF: PRESENT /LPF
NITRATE UR QL: NEGATIVE
PH UR STRIP: 5.5 [PH] (ref 5–7)
RBC URINE: 0 /HPF
SP GR UR STRIP: 1.02 (ref 1–1.03)
UROBILINOGEN UR STRIP-MCNC: NORMAL MG/DL
WBC URINE: <1 /HPF

## 2025-05-23 PROCEDURE — 250N000011 HC RX IP 250 OP 636: Mod: JZ

## 2025-05-23 PROCEDURE — 99284 EMERGENCY DEPT VISIT MOD MDM: CPT

## 2025-05-23 PROCEDURE — 81001 URINALYSIS AUTO W/SCOPE: CPT

## 2025-05-23 PROCEDURE — 96372 THER/PROPH/DIAG INJ SC/IM: CPT

## 2025-05-23 PROCEDURE — 250N000012 HC RX MED GY IP 250 OP 636 PS 637

## 2025-05-23 PROCEDURE — 250N000013 HC RX MED GY IP 250 OP 250 PS 637

## 2025-05-23 RX ORDER — LIDOCAINE 50 MG/G
1 PATCH TOPICAL EVERY 24 HOURS
Qty: 10 PATCH | Refills: 0 | Status: SHIPPED | OUTPATIENT
Start: 2025-05-23 | End: 2025-06-02

## 2025-05-23 RX ORDER — KETOROLAC TROMETHAMINE 15 MG/ML
15 INJECTION, SOLUTION INTRAMUSCULAR; INTRAVENOUS ONCE
Status: COMPLETED | OUTPATIENT
Start: 2025-05-23 | End: 2025-05-23

## 2025-05-23 RX ORDER — ACETAMINOPHEN 325 MG/1
650 TABLET ORAL ONCE
Status: COMPLETED | OUTPATIENT
Start: 2025-05-23 | End: 2025-05-23

## 2025-05-23 RX ORDER — LIDOCAINE 4 G/G
1 PATCH TOPICAL ONCE
Status: DISCONTINUED | OUTPATIENT
Start: 2025-05-23 | End: 2025-05-23 | Stop reason: HOSPADM

## 2025-05-23 RX ORDER — PREDNISONE 20 MG/1
40 TABLET ORAL ONCE
Status: COMPLETED | OUTPATIENT
Start: 2025-05-23 | End: 2025-05-23

## 2025-05-23 RX ORDER — METHOCARBAMOL 500 MG/1
500 TABLET, FILM COATED ORAL ONCE
Status: COMPLETED | OUTPATIENT
Start: 2025-05-23 | End: 2025-05-23

## 2025-05-23 RX ORDER — PREDNISONE 20 MG/1
TABLET ORAL
Qty: 8 TABLET | Refills: 0 | Status: SHIPPED | OUTPATIENT
Start: 2025-05-23

## 2025-05-23 RX ADMIN — LIDOCAINE 1 PATCH: 4 PATCH TOPICAL at 18:08

## 2025-05-23 RX ADMIN — KETOROLAC TROMETHAMINE 15 MG: 15 INJECTION, SOLUTION INTRAMUSCULAR; INTRAVENOUS at 18:07

## 2025-05-23 RX ADMIN — ACETAMINOPHEN 650 MG: 325 TABLET ORAL at 18:07

## 2025-05-23 RX ADMIN — METHOCARBAMOL 500 MG: 500 TABLET ORAL at 18:07

## 2025-05-23 RX ADMIN — PREDNISONE 40 MG: 20 TABLET ORAL at 18:08

## 2025-05-23 ASSESSMENT — ACTIVITIES OF DAILY LIVING (ADL): ADLS_ACUITY_SCORE: 41

## 2025-05-23 NOTE — Clinical Note
Roma Velásquez was seen and treated in our emergency department on 5/23/2025.  He may return to work on 05/26/2025.       If you have any questions or concerns, please don't hesitate to call.      Reynaldo Dale PA-C

## 2025-05-23 NOTE — ED PROVIDER NOTES
Emergency Department Encounter   NAME: Rmoa Velásquez ; AGE: 65 year old male ; YOB: 1960 ; MRN: [id number] ; PCP: Antoinette Taylor   ED PROVIDER: Kezia Santoyo PA-C    Evaluation Date & Time:   No admission date for patient encounter.    CHIEF COMPLAINT:  Hip Pain      Impression and Plan   MDM: Roma Velásquez is a 65 year old male who presents to the ED for evaluation of right-sided low back pain.  Patient states he has had pain for over 2 years, but in the last week has noticed increase in pain.  States he has trouble walking because of the pain.  Denies any radiation of pain down into his right leg, denies any weakness, numbness or tingling in his lower extremities, bowel or bladder incontinence.  Denies any abdominal pain or urinary symptoms.  No recent trauma or injury to the area.  States he went to urgent care in 2024 and was prescribed Tylenol, but this does not help.  Has not been taking anything else for pain.  Does radiate that the pain is radiating into his lower abdomen intermittently, but denies any urinary symptoms.    Patient is vitally normal, no acute distress, ambulates appropriately. Physical exam is significant for no tenderness to palpation of the lumbar spine or paraspinal muscles bilaterally, no muscle spasms noted.  There is no overlying redness, erythema, swelling to the spine.  There is no tenderness to palpation along the lateral hip or into the knee joint bilaterally.  Range of motion intact in bilateral lower extremities.  Strength and sensation intact in BLE.  No saddle anesthesia noted on exam.  No abdominal tenderness on exam.  Considered but overall low suspicion for cauda equina syndrome, epidural abscess, other pathology that would require emergent MRI imaging as there are no red flag signs of back pain.  Suspicious for left-sided lumbar spinal pain without sciatica as there is been no trauma or injury to the spine.    No signs indicating need for emergent MRI imaging.   Considered but ultimately decided against x-ray imaging as there has been no history of trauma or injury to the area that could lead to a cause of this patient's pain.  Will get a UA to assess for hematuria but otherwise treat symptomatically.  Due to staffing shortages, there is a significant delay in patient receiving medications during his visit here today.    Patient was signed out to Reynaldo BAINS at the end of my shift pending medications and final disposition, likely home.    Per chart review:  -Patient was last seen in the ED on 11/4/2020 for  - Recent labs and imaging reviewed  - Care everywhere reviewed    Medical Decision Making      Discharge. I prescribed additional prescription strength medication(s) as charted. N/A.    MIPS (CTPE, Dental pain, Sullivan, Sinusitis, Asthma/COPD, Head Trauma): Not Applicable    SEPSIS: None        ED COURSE:  3:21 PM I met and introduced myself to the patient. I gathered initial history and performed my physical exam. We discussed plan for initial workup.   4:22 PM patient was signed out to Reynaldo BAINS at the end of my shift.    FINAL IMPRESSION:    ICD-10-CM    1. Chronic low back pain without sciatica, unspecified back pain laterality  M54.50     G89.29             MEDICATIONS GIVEN IN THE EMERGENCY DEPARTMENT:  Medications   Lidocaine (LIDOCARE) 4 % Patch 1 patch (1 patch Transdermal $Patch/Med Applied 5/23/25 1808)   acetaminophen (TYLENOL) tablet 650 mg (650 mg Oral $Given 5/23/25 1807)   ketorolac (TORADOL) injection 15 mg (15 mg Intramuscular $Given by Other 5/23/25 1807)   methocarbamol (ROBAXIN) tablet 500 mg (500 mg Oral $Given 5/23/25 1807)   predniSONE (DELTASONE) tablet 40 mg (40 mg Oral $Given 5/23/25 1808)         NEW PRESCRIPTIONS STARTED AT TODAY'S ED VISIT:  Discharge Medication List as of 5/23/2025  7:38 PM            HPI   Use of Intrepreter: Yes, Aure phone     Roma Velásquez is a 65 year old male with a pertinent history of peritonsillar abscess who  presents to the ED by walk-in for evaluation of right-sided low back pain.  Patient states he has had pain for over 2 years, but in the last week has noticed increase in pain.  States he has trouble walking because of the pain.  Denies any radiation of pain down into his right leg, denies any weakness, numbness or tingling in his lower extremities, bowel or bladder incontinence.  Denies any abdominal pain or urinary symptoms.  No recent trauma or injury to the area.  States he went to urgent care in  and was prescribed Tylenol, but this does not help.  Has not been taking anything else for pain.  Does radiate that the pain is radiating into his lower abdomen intermittently, but has any urinary symptoms.       REVIEW OF SYSTEMS:  Pertinent positive and negative symptoms per HPI.       Medical History     Past Medical History:   Diagnosis Date    No disease found        Past Surgical History:   Procedure Laterality Date    NO PAST SURGERIES         Family History   Problem Relation Age of Onset    No Known Problems Mother          pf old age, no medical care available    No Known Problems Father          pf old age, no medical care available       Social History     Tobacco Use    Smoking status: Former     Current packs/day: 0.00     Types: Cigarettes     Quit date: 2008     Years since quittin.4    Smokeless tobacco: Current   Substance Use Topics    Alcohol use: Not Currently    Drug use: Never       lidocaine (LIDODERM) 5 % patch  predniSONE (DELTASONE) 20 MG tablet  acetaminophen (TYLENOL) 500 MG tablet  albuterol (PROAIR HFA) 108 (90 Base) MCG/ACT inhaler  benzonatate (TESSALON) 100 MG capsule  famotidine (PEPCID) 20 MG tablet  ondansetron (ZOFRAN ODT) 4 MG ODT tab  predniSONE (DELTASONE) 50 MG tablet  spacer (OPTICHAMBER MURRAY) holding chamber          Physical Exam     First Vitals:  Patient Vitals for the past 24 hrs:   BP Temp Temp src Pulse Resp SpO2 Height Weight   25 1933  "114/76 -- -- 71 15 97 % -- --   05/23/25 1903 113/76 -- -- 74 -- 96 % -- --   05/23/25 1357 114/68 98.4  F (36.9  C) Tympanic 84 16 97 % 1.575 m (5' 2\") 65.3 kg (144 lb)         PHYSICAL EXAM:   Physical Exam  Constitutional:       General: He is not in acute distress.     Appearance: Normal appearance. He is not toxic-appearing.   HENT:      Head: Atraumatic.      Right Ear: External ear normal.      Left Ear: External ear normal.   Eyes:      General: No scleral icterus.     Conjunctiva/sclera: Conjunctivae normal.   Cardiovascular:      Rate and Rhythm: Normal rate.      Heart sounds: Normal heart sounds.   Pulmonary:      Effort: Pulmonary effort is normal. No respiratory distress.      Breath sounds: Normal breath sounds.   Abdominal:      Palpations: Abdomen is soft.      Tenderness: There is no abdominal tenderness.   Musculoskeletal:         General: No deformity.      Cervical back: Neck supple. No tenderness or bony tenderness.      Thoracic back: No tenderness or bony tenderness.      Lumbar back: Spasms and tenderness (right) present. No swelling, edema or bony tenderness. Normal range of motion.   Skin:     General: Skin is warm.      Capillary Refill: Capillary refill takes less than 2 seconds.   Neurological:      General: No focal deficit present.      Mental Status: He is alert and oriented to person, place, and time.      Motor: No weakness.      Gait: Gait normal.      Comments: Strength and sensation intact in BLE.             Results     LAB:  All pertinent labs reviewed and interpreted  Labs Ordered and Resulted from Time of ED Arrival to Time of ED Departure   ROUTINE UA WITH MICROSCOPIC REFLEX TO CULTURE - Abnormal       Result Value    Color Urine Light Yellow      Appearance Urine Clear      Glucose Urine Negative      Bilirubin Urine Negative      Ketones Urine Negative      Specific Gravity Urine 1.020      Blood Urine Negative      pH Urine 5.5      Protein Albumin Urine Negative      " Urobilinogen Urine Normal      Nitrite Urine Negative      Leukocyte Esterase Urine Negative      Mucus Urine Present (*)     RBC Urine 0      WBC Urine <1         RADIOLOGY:  No orders to display         Kezia Santoyo PA-C   Emergency Medicine   [facility] EMERGENCY DEPARTMENT       Kezia Santoyo PA-C  05/23/25 2044

## 2025-05-23 NOTE — ED PROVIDER NOTES
EMERGENCY DEPARTMENT SIGN OUT NOTE        ED COURSE AND MEDICAL DECISION MAKING  Patient was signed out to me by Kezia Santoyo PA-C at 4:30 PM    In brief, Roma Velásquez is a 65 year old male who initially presented complaining of right hip and thigh pain for 2 years that has gotten significantly worse recently. No red flag symptoms. No recent trauma. UA normal. Patient feeling better after pain medication. I discussed to follow up with his PCP and attached the information for [facility]. I advised him to return to the emergency room if he has any saddle anesthesia, bowel or bladder incontinence, or BLE weakness/ numbness.    At time of sign out, disposition was pending medication administration.     FINAL IMPRESSION    1. Chronic low back pain without sciatica, unspecified back pain laterality        ED MEDS  Medications   Lidocaine (LIDOCARE) 4 % Patch 1 patch (1 patch Transdermal $Patch/Med Applied 5/23/25 1808)   acetaminophen (TYLENOL) tablet 650 mg (650 mg Oral $Given 5/23/25 1807)   ketorolac (TORADOL) injection 15 mg (15 mg Intramuscular $Given by Other 5/23/25 1807)   methocarbamol (ROBAXIN) tablet 500 mg (500 mg Oral $Given 5/23/25 1807)   predniSONE (DELTASONE) tablet 40 mg (40 mg Oral $Given 5/23/25 1808)       LAB  Labs Ordered and Resulted from Time of ED Arrival to Time of ED Departure   ROUTINE UA WITH MICROSCOPIC REFLEX TO CULTURE - Abnormal       Result Value    Color Urine Light Yellow      Appearance Urine Clear      Glucose Urine Negative      Bilirubin Urine Negative      Ketones Urine Negative      Specific Gravity Urine 1.020      Blood Urine Negative      pH Urine 5.5      Protein Albumin Urine Negative      Urobilinogen Urine Normal      Nitrite Urine Negative      Leukocyte Esterase Urine Negative      Mucus Urine Present (*)     RBC Urine 0      WBC Urine <1           RADIOLOGY    No orders to display       DISCHARGE MEDS  Discharge Medication List as of 5/23/2025  7:38 PM             Reynaldo Dale PA-C  Emergency Medicine  [facility] EMERGENCY DEPARTMENT  [address] [place] [address] [id number] [phone]       Reynaldo Dale PA-C  05/23/25 2030

## 2025-05-24 NOTE — DISCHARGE INSTRUCTIONS
Your emergency room evaluation today is reassuring.  I recommend you follow-up with your primary care provider or [facility] spine/neurosurgery for further evaluation of your back pain.  You can take Tylenol or ibuprofen as needed for your pain as well as over-the-counter lidocaine patches.  If you have any numbness in your groin area, weakness in your legs, or loss of bowel or bladder function, please return to the emergency department for further evaluation.